# Patient Record
Sex: FEMALE | Race: BLACK OR AFRICAN AMERICAN | NOT HISPANIC OR LATINO | URBAN - METROPOLITAN AREA
[De-identification: names, ages, dates, MRNs, and addresses within clinical notes are randomized per-mention and may not be internally consistent; named-entity substitution may affect disease eponyms.]

---

## 2023-06-25 ENCOUNTER — INPATIENT (INPATIENT)
Facility: HOSPITAL | Age: 73
LOS: 2 days | Discharge: HOME | End: 2023-06-28
Attending: INTERNAL MEDICINE | Admitting: INTERNAL MEDICINE
Payer: MEDICAID

## 2023-06-25 VITALS
DIASTOLIC BLOOD PRESSURE: 112 MMHG | WEIGHT: 130.07 LBS | SYSTOLIC BLOOD PRESSURE: 192 MMHG | TEMPERATURE: 98 F | OXYGEN SATURATION: 95 % | HEART RATE: 96 BPM | RESPIRATION RATE: 18 BRPM

## 2023-06-25 DIAGNOSIS — Z98.49 CATARACT EXTRACTION STATUS, UNSPECIFIED EYE: Chronic | ICD-10-CM

## 2023-06-25 LAB
ALBUMIN SERPL ELPH-MCNC: 3.2 G/DL — LOW (ref 3.3–5)
ALP SERPL-CCNC: 104 U/L — SIGNIFICANT CHANGE UP (ref 40–120)
ALT FLD-CCNC: 15 U/L — SIGNIFICANT CHANGE UP (ref 12–78)
ANION GAP SERPL CALC-SCNC: 6 MMOL/L — SIGNIFICANT CHANGE UP (ref 5–17)
ANISOCYTOSIS BLD QL: SLIGHT — SIGNIFICANT CHANGE UP
APPEARANCE UR: CLEAR — SIGNIFICANT CHANGE UP
APTT BLD: 25.9 SEC — LOW (ref 27.5–35.5)
AST SERPL-CCNC: 21 U/L — SIGNIFICANT CHANGE UP (ref 15–37)
BACTERIA # UR AUTO: ABNORMAL
BASOPHILS # BLD AUTO: 0.05 K/UL — SIGNIFICANT CHANGE UP (ref 0–0.2)
BASOPHILS NFR BLD AUTO: 0.3 % — SIGNIFICANT CHANGE UP (ref 0–2)
BILIRUB SERPL-MCNC: 0.2 MG/DL — SIGNIFICANT CHANGE UP (ref 0.2–1.2)
BILIRUB UR-MCNC: NEGATIVE — SIGNIFICANT CHANGE UP
BUN SERPL-MCNC: 16 MG/DL — SIGNIFICANT CHANGE UP (ref 7–23)
CALCIUM SERPL-MCNC: 9 MG/DL — SIGNIFICANT CHANGE UP (ref 8.5–10.1)
CHLORIDE SERPL-SCNC: 106 MMOL/L — SIGNIFICANT CHANGE UP (ref 96–108)
CO2 SERPL-SCNC: 27 MMOL/L — SIGNIFICANT CHANGE UP (ref 22–31)
COLOR SPEC: YELLOW — SIGNIFICANT CHANGE UP
CREAT SERPL-MCNC: 0.64 MG/DL — SIGNIFICANT CHANGE UP (ref 0.5–1.3)
DIFF PNL FLD: ABNORMAL
EGFR: 94 ML/MIN/1.73M2 — SIGNIFICANT CHANGE UP
EOSINOPHIL # BLD AUTO: 0.05 K/UL — SIGNIFICANT CHANGE UP (ref 0–0.5)
EOSINOPHIL NFR BLD AUTO: 0.3 % — SIGNIFICANT CHANGE UP (ref 0–6)
GLUCOSE SERPL-MCNC: 110 MG/DL — HIGH (ref 70–99)
GLUCOSE UR QL: NEGATIVE MG/DL — SIGNIFICANT CHANGE UP
HCT VFR BLD CALC: 30.2 % — LOW (ref 34.5–45)
HGB BLD-MCNC: 9.6 G/DL — LOW (ref 11.5–15.5)
HYPOCHROMIA BLD QL: SIGNIFICANT CHANGE UP
IMM GRANULOCYTES NFR BLD AUTO: 0.8 % — SIGNIFICANT CHANGE UP (ref 0–0.9)
INR BLD: 0.99 RATIO — SIGNIFICANT CHANGE UP (ref 0.88–1.16)
KETONES UR-MCNC: NEGATIVE — SIGNIFICANT CHANGE UP
LACTATE SERPL-SCNC: 1.1 MMOL/L — SIGNIFICANT CHANGE UP (ref 0.7–2)
LACTATE SERPL-SCNC: 3 MMOL/L — HIGH (ref 0.7–2)
LACTATE SERPL-SCNC: 4.7 MMOL/L — CRITICAL HIGH (ref 0.7–2)
LEUKOCYTE ESTERASE UR-ACNC: ABNORMAL
LIDOCAIN IGE QN: 165 U/L — SIGNIFICANT CHANGE UP (ref 73–393)
LYMPHOCYTES # BLD AUTO: 1.37 K/UL — SIGNIFICANT CHANGE UP (ref 1–3.3)
LYMPHOCYTES # BLD AUTO: 9.4 % — LOW (ref 13–44)
MANUAL SMEAR VERIFICATION: SIGNIFICANT CHANGE UP
MCHC RBC-ENTMCNC: 23.8 PG — LOW (ref 27–34)
MCHC RBC-ENTMCNC: 31.8 G/DL — LOW (ref 32–36)
MCV RBC AUTO: 74.9 FL — LOW (ref 80–100)
MONOCYTES # BLD AUTO: 0.34 K/UL — SIGNIFICANT CHANGE UP (ref 0–0.9)
MONOCYTES NFR BLD AUTO: 2.3 % — SIGNIFICANT CHANGE UP (ref 2–14)
NEUTROPHILS # BLD AUTO: 12.64 K/UL — HIGH (ref 1.8–7.4)
NEUTROPHILS NFR BLD AUTO: 86.9 % — HIGH (ref 43–77)
NITRITE UR-MCNC: POSITIVE
NRBC # BLD: 0 /100 WBCS — SIGNIFICANT CHANGE UP (ref 0–0)
PH UR: 7 — SIGNIFICANT CHANGE UP (ref 5–8)
PLAT MORPH BLD: NORMAL — SIGNIFICANT CHANGE UP
PLATELET # BLD AUTO: 431 K/UL — HIGH (ref 150–400)
POTASSIUM SERPL-MCNC: 3.7 MMOL/L — SIGNIFICANT CHANGE UP (ref 3.5–5.3)
POTASSIUM SERPL-SCNC: 3.7 MMOL/L — SIGNIFICANT CHANGE UP (ref 3.5–5.3)
PROT SERPL-MCNC: 8.9 GM/DL — HIGH (ref 6–8.3)
PROT UR-MCNC: NEGATIVE MG/DL — SIGNIFICANT CHANGE UP
PROTHROM AB SERPL-ACNC: 11.9 SEC — SIGNIFICANT CHANGE UP (ref 10.5–13.4)
RBC # BLD: 4.03 M/UL — SIGNIFICANT CHANGE UP (ref 3.8–5.2)
RBC # FLD: 17.7 % — HIGH (ref 10.3–14.5)
RBC BLD AUTO: ABNORMAL
RBC CASTS # UR COMP ASSIST: ABNORMAL /HPF (ref 0–4)
SODIUM SERPL-SCNC: 139 MMOL/L — SIGNIFICANT CHANGE UP (ref 135–145)
SP GR SPEC: 1 — LOW (ref 1.01–1.02)
TROPONIN I, HIGH SENSITIVITY RESULT: 6.2 NG/L — SIGNIFICANT CHANGE UP
UROBILINOGEN FLD QL: NEGATIVE MG/DL — SIGNIFICANT CHANGE UP
WBC # BLD: 14.57 K/UL — HIGH (ref 3.8–10.5)
WBC # FLD AUTO: 14.57 K/UL — HIGH (ref 3.8–10.5)
WBC UR QL: ABNORMAL

## 2023-06-25 PROCEDURE — 99285 EMERGENCY DEPT VISIT HI MDM: CPT

## 2023-06-25 PROCEDURE — 71045 X-RAY EXAM CHEST 1 VIEW: CPT | Mod: 26

## 2023-06-25 PROCEDURE — 99223 1ST HOSP IP/OBS HIGH 75: CPT

## 2023-06-25 PROCEDURE — 93010 ELECTROCARDIOGRAM REPORT: CPT

## 2023-06-25 PROCEDURE — 74177 CT ABD & PELVIS W/CONTRAST: CPT | Mod: 26,MA

## 2023-06-25 RX ORDER — SODIUM CHLORIDE 9 MG/ML
1000 INJECTION INTRAMUSCULAR; INTRAVENOUS; SUBCUTANEOUS
Refills: 0 | Status: DISCONTINUED | OUTPATIENT
Start: 2023-06-25 | End: 2023-06-26

## 2023-06-25 RX ORDER — AMLODIPINE BESYLATE 2.5 MG/1
10 TABLET ORAL ONCE
Refills: 0 | Status: COMPLETED | OUTPATIENT
Start: 2023-06-25 | End: 2023-06-25

## 2023-06-25 RX ORDER — SODIUM CHLORIDE 9 MG/ML
1000 INJECTION INTRAMUSCULAR; INTRAVENOUS; SUBCUTANEOUS ONCE
Refills: 0 | Status: COMPLETED | OUTPATIENT
Start: 2023-06-25 | End: 2023-06-25

## 2023-06-25 RX ORDER — ONDANSETRON 8 MG/1
4 TABLET, FILM COATED ORAL EVERY 8 HOURS
Refills: 0 | Status: DISCONTINUED | OUTPATIENT
Start: 2023-06-25 | End: 2023-06-28

## 2023-06-25 RX ORDER — ACETAMINOPHEN 500 MG
650 TABLET ORAL EVERY 6 HOURS
Refills: 0 | Status: DISCONTINUED | OUTPATIENT
Start: 2023-06-25 | End: 2023-06-28

## 2023-06-25 RX ORDER — CEFUROXIME AXETIL 250 MG
1 TABLET ORAL
Qty: 14 | Refills: 0
Start: 2023-06-25 | End: 2023-07-01

## 2023-06-25 RX ORDER — ACETAMINOPHEN 500 MG
2 TABLET ORAL
Qty: 30 | Refills: 0
Start: 2023-06-25 | End: 2023-06-29

## 2023-06-25 RX ORDER — FAMOTIDINE 10 MG/ML
20 INJECTION INTRAVENOUS ONCE
Refills: 0 | Status: COMPLETED | OUTPATIENT
Start: 2023-06-25 | End: 2023-06-25

## 2023-06-25 RX ORDER — ENOXAPARIN SODIUM 100 MG/ML
40 INJECTION SUBCUTANEOUS EVERY 24 HOURS
Refills: 0 | Status: DISCONTINUED | OUTPATIENT
Start: 2023-06-25 | End: 2023-06-28

## 2023-06-25 RX ORDER — AMLODIPINE BESYLATE 2.5 MG/1
1 TABLET ORAL
Qty: 30 | Refills: 0
Start: 2023-06-25 | End: 2023-07-24

## 2023-06-25 RX ORDER — LANOLIN ALCOHOL/MO/W.PET/CERES
3 CREAM (GRAM) TOPICAL AT BEDTIME
Refills: 0 | Status: DISCONTINUED | OUTPATIENT
Start: 2023-06-25 | End: 2023-06-28

## 2023-06-25 RX ORDER — KETOROLAC TROMETHAMINE 30 MG/ML
30 SYRINGE (ML) INJECTION ONCE
Refills: 0 | Status: DISCONTINUED | OUTPATIENT
Start: 2023-06-25 | End: 2023-06-25

## 2023-06-25 RX ORDER — ACETAMINOPHEN 500 MG
975 TABLET ORAL ONCE
Refills: 0 | Status: COMPLETED | OUTPATIENT
Start: 2023-06-25 | End: 2023-06-25

## 2023-06-25 RX ORDER — CEFTRIAXONE 500 MG/1
1000 INJECTION, POWDER, FOR SOLUTION INTRAMUSCULAR; INTRAVENOUS ONCE
Refills: 0 | Status: COMPLETED | OUTPATIENT
Start: 2023-06-25 | End: 2023-06-25

## 2023-06-25 RX ORDER — ONDANSETRON 8 MG/1
4 TABLET, FILM COATED ORAL ONCE
Refills: 0 | Status: COMPLETED | OUTPATIENT
Start: 2023-06-25 | End: 2023-06-25

## 2023-06-25 RX ADMIN — Medication 30 MILLIGRAM(S): at 21:00

## 2023-06-25 RX ADMIN — ONDANSETRON 4 MILLIGRAM(S): 8 TABLET, FILM COATED ORAL at 18:39

## 2023-06-25 RX ADMIN — AMLODIPINE BESYLATE 10 MILLIGRAM(S): 2.5 TABLET ORAL at 21:33

## 2023-06-25 RX ADMIN — SODIUM CHLORIDE 75 MILLILITER(S): 9 INJECTION INTRAMUSCULAR; INTRAVENOUS; SUBCUTANEOUS at 23:44

## 2023-06-25 RX ADMIN — CEFTRIAXONE 100 MILLIGRAM(S): 500 INJECTION, POWDER, FOR SOLUTION INTRAMUSCULAR; INTRAVENOUS at 20:36

## 2023-06-25 RX ADMIN — CEFTRIAXONE 1000 MILLIGRAM(S): 500 INJECTION, POWDER, FOR SOLUTION INTRAMUSCULAR; INTRAVENOUS at 21:34

## 2023-06-25 RX ADMIN — SODIUM CHLORIDE 1000 MILLILITER(S): 9 INJECTION INTRAMUSCULAR; INTRAVENOUS; SUBCUTANEOUS at 18:39

## 2023-06-25 RX ADMIN — FAMOTIDINE 20 MILLIGRAM(S): 10 INJECTION INTRAVENOUS at 18:40

## 2023-06-25 RX ADMIN — SODIUM CHLORIDE 1000 MILLILITER(S): 9 INJECTION INTRAMUSCULAR; INTRAVENOUS; SUBCUTANEOUS at 20:21

## 2023-06-25 RX ADMIN — Medication 975 MILLIGRAM(S): at 21:34

## 2023-06-25 RX ADMIN — Medication 975 MILLIGRAM(S): at 21:00

## 2023-06-25 RX ADMIN — Medication 30 MILLIGRAM(S): at 21:34

## 2023-06-25 NOTE — ED PROVIDER NOTE - NSFOLLOWUPCLINICS_GEN_ALL_ED_FT
[facility] [place]  Internal Medicine  [address] [place] [address]  Phone: [phone]  Fax: [phone]  Follow Up Time: 4-6 Days

## 2023-06-25 NOTE — H&P ADULT - NSVTERISKREFERASSESS_GEN_ALL_CORE
Refer to the Assessment tab to view/cancel completed assessment. The patient is a 30y Female complaining of

## 2023-06-25 NOTE — H&P ADULT - NSHPLABSRESULTS_GEN_ALL_CORE
labs personally reviewed and pertinent [facility] documents/labs/diagnostics reviewed                         9.6    14.57 )-----------( 431      ( 2023 18:06 )             30.2       -    139  |  106  |  16  ----------------------------<  110<H>  3.7   |  27  |  0.64    Ca    9.0      2023 18:06    TPro  8.9<H>  /  Alb  3.2<L>  /  TBili  0.2  /  DBili  x   /  AST  21  /  ALT  15  /  AlkPhos  104  -    PT/INR - ( 2023 18:06 )   PT: 11.9 sec;   INR: 0.99 ratio        PTT - ( 2023 18:06 )  PTT:25.9 sec    Urinalysis Basic - ( 2023 19:00 )    Color: Yellow / Appearance: Clear / S.005 / pH: x  Gluc: x / Ketone: Negative  / Bili: Negative / Urobili: Negative mg/dL   Blood: x / Protein: Negative mg/dL / Nitrite: Positive   Leuk Esterase: Small / RBC: 3-5 /HPF / WBC 11-25   Sq Epi: x / Non Sq Epi: x / Bacteria: Moderate    EKG: read by me: NSR at 98bpm, no ST changes or TWI; QTc 0.469      RADIOLOGY: labs personally reviewed and pertinent [facility] documents/labs/diagnostics reviewed                         9.6    14.57 )-----------( 431      ( 2023 18:06 )             30.2           139  |  106  |  16  ----------------------------<  110<H>  3.7   |  27  |  0.64    Ca    9.0      2023 18:06    TPro  8.9<H>  /  Alb  3.2<L>  /  TBili  0.2  /  DBili  x   /  AST  21  /  ALT  15  /  AlkPhos  104  -    PT/INR - ( 2023 18:06 )   PT: 11.9 sec;   INR: 0.99 ratio        PTT - ( 2023 18:06 )  PTT:25.9 sec    Urinalysis Basic - ( 2023 19:00 )    Color: Yellow / Appearance: Clear / S.005 / pH: x  Gluc: x / Ketone: Negative  / Bili: Negative / Urobili: Negative mg/dL   Blood: x / Protein: Negative mg/dL / Nitrite: Positive   Leuk Esterase: Small / RBC: 3-5 /HPF / WBC 11-25   Sq Epi: x / Non Sq Epi: x / Bacteria: Moderate    EKG: read by me: NSR at 98bpm, no ST changes or TWI; QTc 0.469      RADIOLOGY:  23 CT abd/pelvis w/ contrast-  IMPRESSION:  No evidence of acute intra-abdominal pathology.

## 2023-06-25 NOTE — ED ADULT TRIAGE NOTE - CHIEF COMPLAINT QUOTE
patient BIBA c/o of abdominal pain with N/V started today c/o of constipation for 2 days , denied chest pain, denied  dizziness no headache

## 2023-06-25 NOTE — H&P ADULT - ASSESSMENT
ASSESSMENT:  This is a 73 year old female with PMHx of polymyalgia rheumatica not on meds presents for evaluation after a 1 day history of sudden onset abdominal cramping, initially starting yemi-umbilically, and progressively worsening into diffuse intermittent non-radiating abdominal pain.     PLAN:  1.Abdominal pain likely secondary to UTI vs gastroenteritis  -afebrile, no leukocytosis  -will keep NPO  -will start IVF  -will start protonix 40mg IV daily   -will start prn maalox  -lipase  -LFTs  -initial troponin *** at ***, will f/u 2nd troponin  -EKG as above  -will f/u U/A  -will f/u CT abd/pelvis w/ contrast  -will start prn zofran  -will send stool cultures, O&P, and WBC  -will scan if symptoms worsens  -S/P in ED    2.    DVT PPx ASSESSMENT:  This is a 73 year old female with PMHx of polymyalgia rheumatica not on meds presents for evaluation after a 1 day history of sudden onset abdominal cramping, initially starting yemi-umbilically, and progressively worsening into diffuse intermittent non-radiating abdominal pain.     PLAN:  1.Abdominal pain likely secondary to UTI vs gastroenteritis  -afebrile  -leukocytosis 14.57  -will keep NPO  -S/P 1L NS in ED, will switch to LR  -will start protonix 40mg IV daily   -will start prn maalox  -lipase 165  -LFTs within normal limits  -EKG as above  -U/A as above  -will f/u urine cultures  -will f/u CT abd/pelvis w/ contrast  -will start prn zofran  -will send stool cultures, O&P, and WBC  -will scan if symptoms worsens  -S/P in ED  -PTT 25.9  -INR 0.99  -PT 11.9  -initial troponin 6.2 at 18:06  -lactic acid 1.1 --->3.1--->4.7, will f/u repeat        2.Anemia  -hemoglobin 9.6  -hematocrit 30.2  -will monior for now  -will f/u iron, ferritin, TIBC, B12, folate and retic count    3.Thrombocytosis    DVT PPx ASSESSMENT:  This is a 73 year old female with PMHx of polymyalgia rheumatica not on meds presents for evaluation after a 1 day history of sudden onset abdominal cramping, initially starting yemi-umbilically, and progressively worsening into diffuse intermittent non-radiating abdominal pain.     PLAN:  1.Abdominal pain likely secondary to UTI vs gastroenteritis  -afebrile  -leukocytosis 14.57  -will keep NPO  -S/P 1L NS in ED, will switch to LR  -lipase 165  -LFTs within normal limits  -EKG as above  -U/A as above  -will f/u urine cultures  -CT abd/pelvis w/ contrast as above  -will f/u official report of CXRAY  -will start prn zofran  -will send stool cultures, O&P, and WBC if has >3 episodes of diarrhea in 24 hours  -S/P rocephin 1g IV X 1 in ED, will continue  -S/P tylenol 975mg po X 1, pepcid 20mg IV X1, toradol 30mg IV X 1, zofran 4mg IV X 1 and norvasc 10mg po X 1 in ED  -PTT 25.9  -INR 0.99  -PT 11.9  -initial troponin 6.2 at 18:06  -lactic acid 1.1 --->3.1--->4.7, will f/u repeat  -will f/u stool cultures  -will consult PT in AM    2.Anemia  -hemoglobin 9.6  -hematocrit 30.2  -will monitor for now  -will f/u iron, ferritin, TIBC, B12, folate and retic count    3.Thrombocytosis    4.PMR    5.DVT PPx  -will start lovenox 40mg SQ QD and SCDs

## 2023-06-25 NOTE — H&P ADULT - HISTORY OF PRESENT ILLNESS
This is a 73 year old female with PMHx of polymyalgia rheumatica not on meds presents for evaluation after a 1 day history of sudden onset abdominal cramping, initially starting yemi-umbilically, and progressively worsening into diffuse intermittent non-radiating abdominal pain.  as per son was at baseline, then suddenly while sitting developed pain.  Patient does not ambulate, so was picked up and placed on toilet for a possible bowel movement, but pain worsened.  She did have 1 episode of non-bloody non-bilious emesis.  +3 episodes watery, nonbloody diarrhea, last about 1 hour ago.  Of note she recently travelled from [facility] 3 days prior without issue.  Others have eaten same food as her in the house without issue.

## 2023-06-25 NOTE — H&P ADULT - NSHPPHYSICALEXAM_GEN_ALL_CORE
PHYSICAL EXAM:  Vital Signs:  Vital Signs (24 Hrs):  T(C): 37.1 (06-25-23 @ 22:56), Max: 37.3 (06-25-23 @ 21:07)  HR: 98 (06-25-23 @ 22:40) (96 - 98)  BP: 163/76 (06-25-23 @ 22:40) (163/76 - 192/112)  RR: 16 (06-25-23 @ 22:40) (16 - 18)  SpO2: 95% (06-25-23 @ 22:40) (95% - 96%)  Wt(kg): --  Daily     Daily     I&O's Summary    General: no acute distress and well developed/well nourished, thin  HENT: atraumatic, normocephalic, oropharynx pink and moist, sinuses nontender, normal nasal mucosa/turbinates, thyroid not enlarged or tender, no radiating carotid murmur or bruit and no masses; no JVD  Eyes: pupil equally round and reactive to light (PERRLA) bilaterally, extra-ocular muscle intact (EOMI) bilaterally, lids/conjunctiva normal bilaterally and anicteric bilaterally  Neck: normal, supple, no adenopathy and thyroid normal in size, no nodules or tenderness  CV: normal s1, s2 , regular rhythm, no murmurs, no rubs and no gallops  Lungs: clear to auscultation no w/r/r  Abd: normal bowel sounds, no hepatosplenomegaly, non-tender, non-distended and no masses  Musculoskeletal: no clubbing, cyanosis and full range of motion of joints: right upper extremity, left upper extremity, right lower extremity and left lower extremity;  2+ Peripheral Pulses, No clubbing, cyanosis, or edema  Neuro: alert, awake & oriented times three (AA&O x 3), cranial Nerves II-XII intact and normal strength  Psych: normal judgment and insight, normal mood/affect and non-anxious  Vascular: 2+ radial and dorsalis pedis pulses B/L equal and symmetric  Skin: no rash, warm and dry

## 2023-06-25 NOTE — ED PROVIDER NOTE - OBJECTIVE STATEMENT
74 y/o female with polyarthritis rheumatica brought in by son for abdominal pain and vomiting x 1 day. Son reports pt was c/o pain and vomiting 1 episode of solid food. Denies diarrhea. Last bm was 2 days ago. Pt recently came from [facility] about 3 days ago. Currently denies any abdomina pain, fever, chills, urinary symptoms, chest pain, dyspnea. Denies sick contact.

## 2023-06-25 NOTE — H&P ADULT - NSHPREVIEWOFSYSTEMS_GEN_ALL_CORE
He denies her having any fever, chills, cough, sore throat, rhinorrhea, chest pain, SOB, dysuria, weakness, lethargy, sick contact.

## 2023-06-25 NOTE — ED PROVIDER NOTE - NSFOLLOWUPINSTRUCTIONS_ED_ALL_ED_FT
Take the antibiotics completely as prescribed: CEFUROXIME.    Take the pain medicine ACETAMINOPHEN as needed. Take the antibiotics completely as prescribed: CEFUROXIME.    Take the pain medicine ACETAMINOPHEN as needed.    Call the primary care clinic to follow up with your blood pressure.

## 2023-06-25 NOTE — ED PROVIDER NOTE - NS ED ATTENDING STATEMENT MOD
This was a shared visit with the ROBEL. I reviewed and verified the documentation and independently performed the documented:

## 2023-06-25 NOTE — ED PROVIDER NOTE - PATIENT PORTAL LINK FT
You can access the FollowMyHealth Patient Portal offered by [facility] by registering at the following website: [email or web address]. By joining [employer]’s FollowMyHealth portal, you will also be able to view your health information using other applications (apps) compatible with our system.

## 2023-06-25 NOTE — ED ADULT NURSE NOTE - NSFALLHARMRISKINTERV_ED_ALL_ED
Assistance OOB with selected safe patient handling equipment if applicable/Communicate risk of Fall with Harm to all staff, patient, and family/Provide visual cue: red socks, yellow wristband, yellow gown, etc/Reinforce activity limits and safety measures with patient and family/Bed in lowest position, wheels locked, appropriate side rails in place/Call bell, personal items and telephone in reach/Instruct patient to call for assistance before getting out of bed/chair/stretcher/Non-slip footwear applied when patient is off stretcher/[place] to call system/Physically safe environment - no spills, clutter or unnecessary equipment/Purposeful Proactive Rounding/Room/bathroom lighting operational, light cord in reach

## 2023-06-25 NOTE — ED PROVIDER NOTE - NS ED ROS FT
CONSTITUTIONAL: No fever, no chills, no fatigue  EYES: No visual changes  ENT: No ear pain, no sore throat  CARDIOVASCULAR: No chest pain, no palpitations  RESPIRATORY: No cough, no SOB  GI: no constipation, no diarrhea  GENITOURINARY: No dysuria, no frequency, no hematuria  MUSKULOSKELETAL: No back pain, no joint pain, no myalgias  SKIN: No rash  NEURO: No headache    ALL OTHER SYSTEMS NEGATIVE.

## 2023-06-25 NOTE — ED ADULT TRIAGE NOTE - BIRTH SEX
Female Complex Repair And Xenograft Text: The defect edges were debeveled with a #15 scalpel blade.  The primary defect was closed partially with a complex linear closure.  Given the location of the defect, shape of the defect and the proximity to free margins a xenograft was deemed most appropriate to repair the remaining defect.  The graft was trimmed to fit the size of the remaining defect.  The graft was then placed in the primary defect, oriented appropriately, and sutured into place.

## 2023-06-25 NOTE — ED PROVIDER NOTE - CLINICAL SUMMARY MEDICAL DECISION MAKING FREE TEXT BOX
74 y/o female with polyarthritis rheumatica here with vomiting and abdominal pain x 1 day. Currently denies any pain. Recently from [facility].   will check labs including troponin, UA, ivf, pepcid, zofran, ekg and obtain ct a/p. 72 y/o female with polyarthritis rheumatica here with vomiting and abdominal pain x 1 day. Currently denies any pain. Recently from [place].   will check labs including troponin, UA, ivf, pepcid, zofran, ekg and obtain ct a/p.    wbc 14 and ua suggests uti. will start abx. otherwise, non toxic and tolerating po. can dc home 74 y/o female with polyarthritis rheumatica here with vomiting and abdominal pain x 1 day. Currently denies any pain. Recently from [facility].   will check labs including troponin, UA, ivf, pepcid, zofran, ekg and obtain ct a/p.    wbc 14 and ua suggests uti. will start abx. otherwise, non toxic and tolerating po.     lactic acids are rising, will admit for serial belly exams. at 10:35pm, she has no pain is very comfortable and has tolerated po.

## 2023-06-26 DIAGNOSIS — K52.9 NONINFECTIVE GASTROENTERITIS AND COLITIS, UNSPECIFIED: ICD-10-CM

## 2023-06-26 DIAGNOSIS — D63.8 ANEMIA IN OTHER CHRONIC DISEASES CLASSIFIED ELSEWHERE: ICD-10-CM

## 2023-06-26 DIAGNOSIS — M06.9 RHEUMATOID ARTHRITIS, UNSPECIFIED: ICD-10-CM

## 2023-06-26 DIAGNOSIS — N39.0 URINARY TRACT INFECTION, SITE NOT SPECIFIED: ICD-10-CM

## 2023-06-26 LAB
ANION GAP SERPL CALC-SCNC: 9 MMOL/L — SIGNIFICANT CHANGE UP (ref 5–17)
BUN SERPL-MCNC: 9 MG/DL — SIGNIFICANT CHANGE UP (ref 7–23)
CALCIUM SERPL-MCNC: 8.5 MG/DL — SIGNIFICANT CHANGE UP (ref 8.5–10.1)
CHLORIDE SERPL-SCNC: 108 MMOL/L — SIGNIFICANT CHANGE UP (ref 96–108)
CO2 SERPL-SCNC: 27 MMOL/L — SIGNIFICANT CHANGE UP (ref 22–31)
CREAT SERPL-MCNC: 0.59 MG/DL — SIGNIFICANT CHANGE UP (ref 0.5–1.3)
EGFR: 96 ML/MIN/1.73M2 — SIGNIFICANT CHANGE UP
FERRITIN SERPL-MCNC: 24 NG/ML — SIGNIFICANT CHANGE UP (ref 15–150)
FOLATE SERPL-MCNC: >20 NG/ML — SIGNIFICANT CHANGE UP
GLUCOSE SERPL-MCNC: 98 MG/DL — SIGNIFICANT CHANGE UP (ref 70–99)
HCT VFR BLD CALC: 26 % — LOW (ref 34.5–45)
HCV AB S/CO SERPL IA: 0.96 S/CO — SIGNIFICANT CHANGE UP (ref 0–0.99)
HCV AB SERPL-IMP: SIGNIFICANT CHANGE UP
HGB BLD-MCNC: 8.3 G/DL — LOW (ref 11.5–15.5)
IRON SATN MFR SERPL: 11 % — LOW (ref 14–50)
IRON SATN MFR SERPL: 30 UG/DL — SIGNIFICANT CHANGE UP (ref 30–160)
LACTATE SERPL-SCNC: 1.2 MMOL/L — SIGNIFICANT CHANGE UP (ref 0.7–2)
MCHC RBC-ENTMCNC: 23.9 PG — LOW (ref 27–34)
MCHC RBC-ENTMCNC: 31.9 G/DL — LOW (ref 32–36)
MCV RBC AUTO: 74.7 FL — LOW (ref 80–100)
NRBC # BLD: 0 /100 WBCS — SIGNIFICANT CHANGE UP (ref 0–0)
PLATELET # BLD AUTO: 392 K/UL — SIGNIFICANT CHANGE UP (ref 150–400)
POTASSIUM SERPL-MCNC: 3.4 MMOL/L — LOW (ref 3.5–5.3)
POTASSIUM SERPL-SCNC: 3.4 MMOL/L — LOW (ref 3.5–5.3)
RBC # BLD: 3.48 M/UL — LOW (ref 3.8–5.2)
RBC # BLD: 3.48 M/UL — LOW (ref 3.8–5.2)
RBC # FLD: 17.7 % — HIGH (ref 10.3–14.5)
RETICS #: 38.6 K/UL — SIGNIFICANT CHANGE UP (ref 25–125)
RETICS/RBC NFR: 1.1 % — SIGNIFICANT CHANGE UP (ref 0.5–2.5)
SODIUM SERPL-SCNC: 144 MMOL/L — SIGNIFICANT CHANGE UP (ref 135–145)
TIBC SERPL-MCNC: 269 UG/DL — SIGNIFICANT CHANGE UP (ref 220–430)
TSH SERPL-MCNC: 1.17 UIU/ML — SIGNIFICANT CHANGE UP (ref 0.36–3.74)
UIBC SERPL-MCNC: 238 UG/DL — SIGNIFICANT CHANGE UP (ref 110–370)
VIT B12 SERPL-MCNC: >2000 PG/ML — HIGH (ref 232–1245)
WBC # BLD: 16.42 K/UL — HIGH (ref 3.8–10.5)
WBC # FLD AUTO: 16.42 K/UL — HIGH (ref 3.8–10.5)

## 2023-06-26 PROCEDURE — 99232 SBSQ HOSP IP/OBS MODERATE 35: CPT

## 2023-06-26 RX ORDER — SODIUM CHLORIDE 9 MG/ML
1000 INJECTION, SOLUTION INTRAVENOUS ONCE
Refills: 0 | Status: COMPLETED | OUTPATIENT
Start: 2023-06-26 | End: 2023-06-26

## 2023-06-26 RX ORDER — SODIUM CHLORIDE 9 MG/ML
1000 INJECTION, SOLUTION INTRAVENOUS
Refills: 0 | Status: DISCONTINUED | OUTPATIENT
Start: 2023-06-26 | End: 2023-06-26

## 2023-06-26 RX ORDER — METOPROLOL TARTRATE 50 MG
12.5 TABLET ORAL ONCE
Refills: 0 | Status: COMPLETED | OUTPATIENT
Start: 2023-06-26 | End: 2023-06-26

## 2023-06-26 RX ORDER — CEFTRIAXONE 500 MG/1
1000 INJECTION, POWDER, FOR SOLUTION INTRAMUSCULAR; INTRAVENOUS EVERY 24 HOURS
Refills: 0 | Status: DISCONTINUED | OUTPATIENT
Start: 2023-06-26 | End: 2023-06-28

## 2023-06-26 RX ADMIN — Medication 650 MILLIGRAM(S): at 11:58

## 2023-06-26 RX ADMIN — ENOXAPARIN SODIUM 40 MILLIGRAM(S): 100 INJECTION SUBCUTANEOUS at 17:16

## 2023-06-26 RX ADMIN — SODIUM CHLORIDE 1000 MILLILITER(S): 9 INJECTION, SOLUTION INTRAVENOUS at 00:40

## 2023-06-26 RX ADMIN — Medication 3 MILLIGRAM(S): at 22:16

## 2023-06-26 RX ADMIN — CEFTRIAXONE 100 MILLIGRAM(S): 500 INJECTION, POWDER, FOR SOLUTION INTRAMUSCULAR; INTRAVENOUS at 20:38

## 2023-06-26 RX ADMIN — Medication 12.5 MILLIGRAM(S): at 00:40

## 2023-06-26 RX ADMIN — Medication 650 MILLIGRAM(S): at 22:16

## 2023-06-26 RX ADMIN — Medication 650 MILLIGRAM(S): at 10:59

## 2023-06-26 RX ADMIN — Medication 650 MILLIGRAM(S): at 23:15

## 2023-06-26 NOTE — PHYSICAL THERAPY INITIAL EVALUATION ADULT - FOLLOWS COMMANDS/ANSWERS QUESTIONS, REHAB EVAL
Unable to count from 100 or 10 backwatds. Unable to do simple addition like 18 +12 or 17+ 15/100% of the time/able to follow single-step instructions

## 2023-06-26 NOTE — PHYSICAL THERAPY INITIAL EVALUATION ADULT - PERTINENT HX OF CURRENT PROBLEM, REHAB EVAL
Pt admitted for abdominal cramping.   No c/o abdominal cramps at this session .  Per patient she has been unable to ambulate since 2014. She was taken care of by her children  in [facility] until 6/21/23  On 6/22/23 pt moved to [place] from [facility] to live with her son Nico . This is her first hospital [place] in [place],

## 2023-06-26 NOTE — PATIENT PROFILE ADULT - FUNCTIONAL ASSESSMENT - DAILY ACTIVITY 1.

## 2023-06-26 NOTE — PHYSICAL THERAPY INITIAL EVALUATION ADULT - GENERAL OBSERVATIONS, REHAB EVAL
Seen with creole translation help from [employer]  ID [id number]. Pt recd supine NAD c BLE in flex 2* pain in B knees/ankles. No c/o pain in B hips and BUE. Noted deformities in B hands . Pt has h/o  Polymyalgia rheumatica.

## 2023-06-26 NOTE — PHYSICAL THERAPY INITIAL EVALUATION ADULT - MODALITIES TREATMENT COMMENTS
Case d/w son over the phone- Pt is functioning at pre morbid level. No restorative needs at this time. Son expressed need for HHA services.

## 2023-06-26 NOTE — PHYSICAL THERAPY INITIAL EVALUATION ADULT - PATIENT/FAMILY AGREES WITH PLAN
Case d/w pt via  and on the phone with son Nico- Pt  is functioning at baseline- no skilled PT needs/yes

## 2023-06-26 NOTE — PHYSICAL THERAPY INITIAL EVALUATION ADULT - ADDITIONAL COMMENTS
Live sin a PH with 3 step to enter c HRs. Pt is carried on the steps in wheelchair . Pt has commode .   Pt uses diapers/[pull ups at night  time.

## 2023-06-26 NOTE — PHYSICAL THERAPY INITIAL EVALUATION ADULT - ORIENTATION, REHAB EVAL
Partial to place and time. Knows she is in hospital but not the name of hospital; Knows month/year but not date/day./person/situation

## 2023-06-26 NOTE — PATIENT PROFILE ADULT - FALL HARM RISK - RISK INTERVENTIONS

## 2023-06-26 NOTE — PHYSICAL THERAPY INITIAL EVALUATION ADULT - TRANSFER SKILLS, REHAB EVAL
Walking Pt is lifted from bed to chair to commode by family  as pt cannot std/walk/unable to perform

## 2023-06-26 NOTE — PROGRESS NOTE ADULT - SUBJECTIVE AND OBJECTIVE BOX
PROGRESS NOTE:   Authored by Dr. Jaquelin Cha MD, Available on MS Teams    Patient is a 73y old  Female who presents with a chief complaint of vomiting (25 Jun 2023 23:23)      SUBJECTIVE / OVERNIGHT EVENTS: French creole  [id number]. Patient feels well, no chest pain or shortness of breath. No dysuria but does have foul smelling urine.    ADDITIONAL REVIEW OF SYSTEMS:    MEDICATIONS  (STANDING):  cefTRIAXone   IVPB 1000 milliGRAM(s) IV Intermittent every 24 hours  enoxaparin Injectable 40 milliGRAM(s) SubCutaneous every 24 hours  lactated ringers. 1000 milliLiter(s) (75 mL/Hr) IV Continuous <Continuous>    MEDICATIONS  (PRN):  acetaminophen     Tablet .. 650 milliGRAM(s) Oral every 6 hours PRN Temp greater or equal to 38C (100.4F), Mild Pain (1 - 3)  aluminum hydroxide/magnesium hydroxide/simethicone Suspension 30 milliLiter(s) Oral every 4 hours PRN Dyspepsia  melatonin 3 milliGRAM(s) Oral at bedtime PRN Insomnia  ondansetron Injectable 4 milliGRAM(s) IV Push every 8 hours PRN Nausea and/or Vomiting      CAPILLARY BLOOD GLUCOSE        I&O's Summary    25 Jun 2023 07:01  -  26 Jun 2023 07:00  --------------------------------------------------------  IN: 0 mL / OUT: 1000 mL / NET: -1000 mL        PHYSICAL EXAM:  Vital Signs Last 24 Hrs  T(C): 36.9 (26 Jun 2023 11:20), Max: 37.3 (25 Jun 2023 21:07)  T(F): 98.4 (26 Jun 2023 11:20), Max: 99.1 (25 Jun 2023 21:07)  HR: 77 (26 Jun 2023 11:20) (77 - 115)  BP: 153/77 (26 Jun 2023 11:20) (145/78 - 192/112)  BP(mean): --  RR: 19 (26 Jun 2023 11:20) (16 - 19)  SpO2: 97% (26 Jun 2023 11:20) (95% - 97%)    Parameters below as of 26 Jun 2023 11:20  Patient On (Oxygen Delivery Method): room air        CONSTITUTIONAL: NAD, well-developed  RESPIRATORY: Normal respiratory effort; lungs are clear to auscultation bilaterally  CARDIOVASCULAR: Regular rate and rhythm, normal S1 and S2, no murmur/rub/gallop; No lower extremity edema  ABDOMEN: Nontender to palpation, normoactive bowel sounds, no rebound/guarding  MUSCLOSKELETAL: no clubbing or cyanosis of digits; no joint swelling or tenderness to palpation  PSYCH: A+O to person, place, and time; affect appropriate    LABS:                        8.3    16.42 )-----------( 392      ( 26 Jun 2023 02:30 )             26.0     06-26    144  |  108  |  9   ----------------------------<  98  3.4<L>   |  27  |  0.59    Ca    8.5      26 Jun 2023 02:30    TPro  8.9<H>  /  Alb  3.2<L>  /  TBili  0.2  /  DBili  x   /  AST  21  /  ALT  15  /  AlkPhos  104  06-25    PT/INR - ( 25 Jun 2023 18:06 )   PT: 11.9 sec;   INR: 0.99 ratio         PTT - ( 25 Jun 2023 18:06 )  PTT:25.9 sec      Urinalysis Basic - ( 26 Jun 2023 02:30 )    Color: x / Appearance: x / SG: x / pH: x  Gluc: 98 mg/dL / Ketone: x  / Bili: x / Urobili: x   Blood: x / Protein: x / Nitrite: x   Leuk Esterase: x / RBC: x / WBC x   Sq Epi: x / Non Sq Epi: x / Bacteria: x

## 2023-06-27 LAB
ANION GAP SERPL CALC-SCNC: 7 MMOL/L — SIGNIFICANT CHANGE UP (ref 5–17)
BASOPHILS # BLD AUTO: 0.03 K/UL — SIGNIFICANT CHANGE UP (ref 0–0.2)
BASOPHILS NFR BLD AUTO: 0.2 % — SIGNIFICANT CHANGE UP (ref 0–2)
BUN SERPL-MCNC: 8 MG/DL — SIGNIFICANT CHANGE UP (ref 7–23)
CALCIUM SERPL-MCNC: 8.5 MG/DL — SIGNIFICANT CHANGE UP (ref 8.5–10.1)
CHLORIDE SERPL-SCNC: 104 MMOL/L — SIGNIFICANT CHANGE UP (ref 96–108)
CO2 SERPL-SCNC: 28 MMOL/L — SIGNIFICANT CHANGE UP (ref 22–31)
CREAT SERPL-MCNC: 0.57 MG/DL — SIGNIFICANT CHANGE UP (ref 0.5–1.3)
EGFR: 97 ML/MIN/1.73M2 — SIGNIFICANT CHANGE UP
EOSINOPHIL # BLD AUTO: 0.05 K/UL — SIGNIFICANT CHANGE UP (ref 0–0.5)
EOSINOPHIL NFR BLD AUTO: 0.4 % — SIGNIFICANT CHANGE UP (ref 0–6)
GLUCOSE SERPL-MCNC: 83 MG/DL — SIGNIFICANT CHANGE UP (ref 70–99)
HCT VFR BLD CALC: 27.8 % — LOW (ref 34.5–45)
HGB BLD-MCNC: 8.8 G/DL — LOW (ref 11.5–15.5)
IMM GRANULOCYTES NFR BLD AUTO: 0.5 % — SIGNIFICANT CHANGE UP (ref 0–0.9)
LYMPHOCYTES # BLD AUTO: 16.8 % — SIGNIFICANT CHANGE UP (ref 13–44)
LYMPHOCYTES # BLD AUTO: 2.2 K/UL — SIGNIFICANT CHANGE UP (ref 1–3.3)
MAGNESIUM SERPL-MCNC: 1.9 MG/DL — SIGNIFICANT CHANGE UP (ref 1.6–2.6)
MCHC RBC-ENTMCNC: 23.8 PG — LOW (ref 27–34)
MCHC RBC-ENTMCNC: 31.7 G/DL — LOW (ref 32–36)
MCV RBC AUTO: 75.3 FL — LOW (ref 80–100)
MONOCYTES # BLD AUTO: 0.42 K/UL — SIGNIFICANT CHANGE UP (ref 0–0.9)
MONOCYTES NFR BLD AUTO: 3.2 % — SIGNIFICANT CHANGE UP (ref 2–14)
NEUTROPHILS # BLD AUTO: 10.34 K/UL — HIGH (ref 1.8–7.4)
NEUTROPHILS NFR BLD AUTO: 78.9 % — HIGH (ref 43–77)
NRBC # BLD: 0 /100 WBCS — SIGNIFICANT CHANGE UP (ref 0–0)
PHOSPHATE SERPL-MCNC: 4.1 MG/DL — SIGNIFICANT CHANGE UP (ref 2.5–4.5)
PLATELET # BLD AUTO: 387 K/UL — SIGNIFICANT CHANGE UP (ref 150–400)
POTASSIUM SERPL-MCNC: 3 MMOL/L — LOW (ref 3.5–5.3)
POTASSIUM SERPL-SCNC: 3 MMOL/L — LOW (ref 3.5–5.3)
PROCALCITONIN SERPL-MCNC: 0.37 NG/ML — HIGH (ref 0.02–0.1)
RBC # BLD: 3.69 M/UL — LOW (ref 3.8–5.2)
RBC # FLD: 17.5 % — HIGH (ref 10.3–14.5)
SODIUM SERPL-SCNC: 139 MMOL/L — SIGNIFICANT CHANGE UP (ref 135–145)
WBC # BLD: 13.1 K/UL — HIGH (ref 3.8–10.5)
WBC # FLD AUTO: 13.1 K/UL — HIGH (ref 3.8–10.5)

## 2023-06-27 PROCEDURE — 99232 SBSQ HOSP IP/OBS MODERATE 35: CPT

## 2023-06-27 RX ORDER — POTASSIUM CHLORIDE 20 MEQ
40 PACKET (EA) ORAL
Refills: 0 | Status: COMPLETED | OUTPATIENT
Start: 2023-06-27 | End: 2023-06-27

## 2023-06-27 RX ADMIN — Medication 40 MILLIEQUIVALENT(S): at 18:07

## 2023-06-27 RX ADMIN — CEFTRIAXONE 100 MILLIGRAM(S): 500 INJECTION, POWDER, FOR SOLUTION INTRAMUSCULAR; INTRAVENOUS at 20:43

## 2023-06-27 RX ADMIN — ENOXAPARIN SODIUM 40 MILLIGRAM(S): 100 INJECTION SUBCUTANEOUS at 18:07

## 2023-06-27 RX ADMIN — Medication 650 MILLIGRAM(S): at 22:53

## 2023-06-27 RX ADMIN — Medication 40 MILLIEQUIVALENT(S): at 12:23

## 2023-06-27 NOTE — PROGRESS NOTE ADULT - SUBJECTIVE AND OBJECTIVE BOX
Patient is a 73y old  Female who presents with a chief complaint of vomiting (26 Jun 2023 15:25)      INTERVAL HPI/OVERNIGHT EVENTS: No acute events overnight, afebrile.     MEDICATIONS  (STANDING):  cefTRIAXone   IVPB 1000 milliGRAM(s) IV Intermittent every 24 hours  enoxaparin Injectable 40 milliGRAM(s) SubCutaneous every 24 hours  potassium chloride   Powder 40 milliEquivalent(s) Oral every 2 hours    MEDICATIONS  (PRN):  acetaminophen     Tablet .. 650 milliGRAM(s) Oral every 6 hours PRN Temp greater or equal to 38C (100.4F), Mild Pain (1 - 3)  aluminum hydroxide/magnesium hydroxide/simethicone Suspension 30 milliLiter(s) Oral every 4 hours PRN Dyspepsia  melatonin 3 milliGRAM(s) Oral at bedtime PRN Insomnia  ondansetron Injectable 4 milliGRAM(s) IV Push every 8 hours PRN Nausea and/or Vomiting      Allergies    No Known Allergies    Intolerances        REVIEW OF SYSTEMS:  CONSTITUTIONAL: +ve for fatigue  EYES: No eye pain, visual disturbances, or discharge  ENMT:  No difficulty hearing, tinnitus, vertigo; No sinus or throat pain  NECK: No pain or stiffness      Vital Signs Last 24 Hrs  T(C): 37.2 (27 Jun 2023 12:13), Max: 37.3 (26 Jun 2023 19:55)  T(F): 99 (27 Jun 2023 12:13), Max: 99.2 (26 Jun 2023 19:55)  HR: 85 (27 Jun 2023 12:13) (80 - 90)  BP: 141/82 (27 Jun 2023 12:13) (138/79 - 150/87)  BP(mean): --  RR: 18 (27 Jun 2023 12:13) (18 - 18)  SpO2: 97% (27 Jun 2023 12:13) (95% - 97%)    Parameters below as of 27 Jun 2023 05:00  Patient On (Oxygen Delivery Method): room air        PHYSICAL EXAM:  GENERAL: NAD, well-groomed, well-developed  HEAD:  Atraumatic, Normocephalic  EYES: EOMI, PERRLA, conjunctiva and sclera clear  ENMT: No tonsillar erythema, exudates, or enlargement; Moist mucous membranes, Good dentition, No lesions      LABS:                        8.8    13.10 )-----------( 387      ( 27 Jun 2023 06:05 )             27.8     06-27    139  |  104  |  8   ----------------------------<  83  3.0<L>   |  28  |  0.57    Ca    8.5      27 Jun 2023 06:05  Phos  4.1     06-27  Mg     1.9     06-27    TPro  8.9<H>  /  Alb  3.2<L>  /  TBili  0.2  /  DBili  x   /  AST  21  /  ALT  15  /  AlkPhos  104  06-25    PT/INR - ( 25 Jun 2023 18:06 )   PT: 11.9 sec;   INR: 0.99 ratio         PTT - ( 25 Jun 2023 18:06 )  PTT:25.9 sec  Urinalysis Basic - ( 27 Jun 2023 06:05 )    Color: x / Appearance: x / SG: x / pH: x  Gluc: 83 mg/dL / Ketone: x  / Bili: x / Urobili: x   Blood: x / Protein: x / Nitrite: x   Leuk Esterase: x / RBC: x / WBC x   Sq Epi: x / Non Sq Epi: x / Bacteria: x

## 2023-06-27 NOTE — PROGRESS NOTE ADULT - ASSESSMENT
A/P    1. UTI:  - Culture pending, continue ceftriaxone  - Afebrile.    2. Hypokalemia:  - Replacing    3. Thrombocytosis:  - Resolved.    Dispo: Discharge tomorrow once we have final culture results.       Nino Mathew MD
ASSESSMENT:  This is a 73 year old female with PMHx of polymyalgia rheumatica not on meds presents for evaluation after a 1 day history of sudden onset abdominal cramping, initially starting yemi-umbilically, and progressively worsening into diffuse intermittent non-radiating abdominal pain.     PLAN:  1.Abdominal pain likely secondary to UTI vs gastroenteritis  -afebrile  -leukocytosis uptrending  -lipase 165  -LFTs within normal limits  -UA positive, started on ceftriaxone  -will f/u urine cultures  -CT abd/pelvis w/ contrast as above, no acute intra-abdominal pathology  - CXRAY neg  -stool cultures, O&P, and WBC if has >3 episodes of diarrhea in 24 hours    2.Anemia  -hemoglobin 9.6  -hematocrit 30.2  -will monitor for now  -will f/u iron, ferritin, TIBC, B12, folate and retic count    3.Thrombocytosis    4.PMR    5.DVT PPx  -will start lovenox 40mg SQ QD and SCDs    Dispo: abx, PT consult

## 2023-06-28 VITALS
RESPIRATION RATE: 17 BRPM | DIASTOLIC BLOOD PRESSURE: 85 MMHG | TEMPERATURE: 98 F | SYSTOLIC BLOOD PRESSURE: 150 MMHG | OXYGEN SATURATION: 97 % | HEART RATE: 91 BPM

## 2023-06-28 LAB
-  AMIKACIN: SIGNIFICANT CHANGE UP
-  AMOXICILLIN/CLAVULANIC ACID: SIGNIFICANT CHANGE UP
-  AMPICILLIN/SULBACTAM: SIGNIFICANT CHANGE UP
-  AMPICILLIN: SIGNIFICANT CHANGE UP
-  AZTREONAM: SIGNIFICANT CHANGE UP
-  CEFAZOLIN: SIGNIFICANT CHANGE UP
-  CEFEPIME: SIGNIFICANT CHANGE UP
-  CEFOXITIN: SIGNIFICANT CHANGE UP
-  CEFTRIAXONE: SIGNIFICANT CHANGE UP
-  CEFUROXIME: SIGNIFICANT CHANGE UP
-  CIPROFLOXACIN: SIGNIFICANT CHANGE UP
-  ERTAPENEM: SIGNIFICANT CHANGE UP
-  GENTAMICIN: SIGNIFICANT CHANGE UP
-  IMIPENEM: SIGNIFICANT CHANGE UP
-  LEVOFLOXACIN: SIGNIFICANT CHANGE UP
-  MEROPENEM: SIGNIFICANT CHANGE UP
-  NITROFURANTOIN: SIGNIFICANT CHANGE UP
-  PIPERACILLIN/TAZOBACTAM: SIGNIFICANT CHANGE UP
-  TOBRAMYCIN: SIGNIFICANT CHANGE UP
-  TRIMETHOPRIM/SULFAMETHOXAZOLE: SIGNIFICANT CHANGE UP
ANION GAP SERPL CALC-SCNC: 5 MMOL/L — SIGNIFICANT CHANGE UP (ref 5–17)
BUN SERPL-MCNC: 7 MG/DL — SIGNIFICANT CHANGE UP (ref 7–23)
CALCIUM SERPL-MCNC: 8.7 MG/DL — SIGNIFICANT CHANGE UP (ref 8.5–10.1)
CHLORIDE SERPL-SCNC: 106 MMOL/L — SIGNIFICANT CHANGE UP (ref 96–108)
CO2 SERPL-SCNC: 28 MMOL/L — SIGNIFICANT CHANGE UP (ref 22–31)
CREAT SERPL-MCNC: 0.59 MG/DL — SIGNIFICANT CHANGE UP (ref 0.5–1.3)
CULTURE RESULTS: SIGNIFICANT CHANGE UP
EGFR: 96 ML/MIN/1.73M2 — SIGNIFICANT CHANGE UP
GLUCOSE SERPL-MCNC: 87 MG/DL — SIGNIFICANT CHANGE UP (ref 70–99)
HCT VFR BLD CALC: 28.5 % — LOW (ref 34.5–45)
HGB BLD-MCNC: 9.2 G/DL — LOW (ref 11.5–15.5)
MCHC RBC-ENTMCNC: 23.8 PG — LOW (ref 27–34)
MCHC RBC-ENTMCNC: 32.3 G/DL — SIGNIFICANT CHANGE UP (ref 32–36)
MCV RBC AUTO: 73.8 FL — LOW (ref 80–100)
METHOD TYPE: SIGNIFICANT CHANGE UP
NRBC # BLD: 0 /100 WBCS — SIGNIFICANT CHANGE UP (ref 0–0)
ORGANISM # SPEC MICROSCOPIC CNT: SIGNIFICANT CHANGE UP
ORGANISM # SPEC MICROSCOPIC CNT: SIGNIFICANT CHANGE UP
PLATELET # BLD AUTO: 424 K/UL — HIGH (ref 150–400)
POTASSIUM SERPL-MCNC: 3.9 MMOL/L — SIGNIFICANT CHANGE UP (ref 3.5–5.3)
POTASSIUM SERPL-SCNC: 3.9 MMOL/L — SIGNIFICANT CHANGE UP (ref 3.5–5.3)
RBC # BLD: 3.86 M/UL — SIGNIFICANT CHANGE UP (ref 3.8–5.2)
RBC # FLD: 17.7 % — HIGH (ref 10.3–14.5)
SODIUM SERPL-SCNC: 139 MMOL/L — SIGNIFICANT CHANGE UP (ref 135–145)
SPECIMEN SOURCE: SIGNIFICANT CHANGE UP
WBC # BLD: 10.47 K/UL — SIGNIFICANT CHANGE UP (ref 3.8–10.5)
WBC # FLD AUTO: 10.47 K/UL — SIGNIFICANT CHANGE UP (ref 3.8–10.5)

## 2023-06-28 PROCEDURE — 99239 HOSP IP/OBS DSCHRG MGMT >30: CPT

## 2023-06-28 NOTE — DISCHARGE NOTE PROVIDER - NSDCMRMEDTOKEN_GEN_ALL_CORE_FT
acetaminophen 500 mg oral capsule: 2 cap(s) orally every 8 hours as needed for  moderate pain  amLODIPine 10 mg oral tablet: 1 tab(s) orally once a day

## 2023-06-28 NOTE — DISCHARGE NOTE PROVIDER - HOSPITAL COURSE
History of Present Illness:   This is a 73 year old female with PMHx of polymyalgia rheumatica not on meds presents for evaluation after a 1 day history of sudden onset abdominal cramping, initially starting yemi-umbilically, and progressively worsening into diffuse intermittent non-radiating abdominal pain.  as per son was at baseline, then suddenly while sitting developed pain.  Patient does not ambulate, so was picked up and placed on toilet for a possible bowel movement, but pain worsened.  She did have 1 episode of non-bloody non-bilious emesis.  +3 episodes watery, nonbloody diarrhea, last about 1 hour ago.  Of note she recently travelled from [facility] 3 days prior without issue.  Others have eaten same food as her in the house without issue.      1. E coli UTI:  - Finished antibiotic course x 3 days. no UTI symptoms.     2. Hypokalemia:  - Repleted.     3. Thrombocytosis:  - Resolved.    4. Chronic arthritic bilateral knee pain. recommended tylenol and NSAIDs as needed for pain and PCP follow up.     Seen and examined by me today. Vitals stable.   I have discussed all the inpatient radiographic findings with the patient and stressed that patient follows with the PCP for further outpatient care. I have educated patient to use [facility] patient portal (as instructed on the discharge paperwork) to access medical records outside the hospital.   All questions welcomed and answered appropriately. Patient verbalized understanding of post discharge physician's follows up and discharge instructions.   DC time spent by face to face me excluding billable procedures 38 mins ([place]  used [id number])

## 2023-06-28 NOTE — DISCHARGE NOTE NURSING/CASE MANAGEMENT/SOCIAL WORK - PATIENT PORTAL LINK FT
You can access the FollowMyHealth Patient Portal offered by [facility] by registering at the following website: [email or web address]. By joining [employer]’s FollowMyHealth portal, you will also be able to view your health information using other applications (apps) compatible with our system.

## 2023-06-28 NOTE — DISCHARGE NOTE PROVIDER - NSDCFUADDINST_GEN_ALL_CORE_FT
1) It is important to see your primary physician as well as other necessary consultants within next 7-10 days to perform a comprehensive medical review.  Call their offices for an appointment as soon as you leave the hospital.  If you do not have a primary physician or unable to reach your PCP, contact the [facility] Physician Referral Service at [phone].  Your medical issues appear to be stable at this time, but if your symptoms recur or worsen, contact your physicians and/or return to the hospital if necessary.  If you encounter any issues or questions with your medication, call your physicians before stopping the medication.    2) Please access [facility] Patient portal (as instructed on the discharge paperwork) to access your medical records at any time after discharge.     3) please drink plenty of liquids. Follow PCP for chronic knee pain.

## 2023-07-03 DIAGNOSIS — M25.562 PAIN IN LEFT KNEE: ICD-10-CM

## 2023-07-03 DIAGNOSIS — D64.9 ANEMIA, UNSPECIFIED: ICD-10-CM

## 2023-07-03 DIAGNOSIS — D75.839 THROMBOCYTOSIS, UNSPECIFIED: ICD-10-CM

## 2023-07-03 DIAGNOSIS — E87.6 HYPOKALEMIA: ICD-10-CM

## 2023-07-03 DIAGNOSIS — N39.0 URINARY TRACT INFECTION, SITE NOT SPECIFIED: ICD-10-CM

## 2023-07-03 DIAGNOSIS — B96.20 UNSPECIFIED ESCHERICHIA COLI [E. COLI] AS THE CAUSE OF DISEASES CLASSIFIED ELSEWHERE: ICD-10-CM

## 2023-07-03 DIAGNOSIS — M35.3 POLYMYALGIA RHEUMATICA: ICD-10-CM

## 2023-07-03 DIAGNOSIS — M25.561 PAIN IN RIGHT KNEE: ICD-10-CM

## 2024-03-16 NOTE — PATIENT PROFILE ADULT - ARE SIGNIFICANT INDICATORS COMPLETE.
Contributing Factors:  Systolic BP  Lactate    MAP  Bilirubin    Systolic BP Drop  PLT    O2 Device  Creatinine 2.24   Non-Surgical Airway  INR      PTT       Patient meets criteria for Severe Sepsis:  Contact provider to initiate Sepsis Huddle  In Sepsis Navigator:  Document huddle outcome  Review 3 and 6 hour bundle compliance checklist  Last 3 Vitals         3/16/2024  0550 3/16/2024  0735 3/16/2024  1057       Temp: -- 97.5 Â°F (36.4 Â°C) 97.3 Â°F (36.3 Â°C)     Pulse: 95 73 101     Resp: 18 -- 20     BP: 109/77 95/68 994/30     Systolic BP Drop (calculated): 19 -14 7     MAP (mmHg): 88 77 81     O2 Device: Nasal cannula Nasal cannula --           Last 3 Labs         03/15/24 03/16/24 03/16/24     0549 0534 0732    LACTATE -- -- 1.8    TOTAL BILIRUBIN -- 1.3 --    PLATELETS 948 315 --    CREATININE 2.39 2.24 --    PTT -- -- 32    PT -- -- 12.9    INR -- -- 1.2    WBC 22.8 22.9 --        Responded to Sepsis Alert called at 07:20  Per the Primary RN the SEVERE sepsis BPA fired during shift report. SIRS HR, WBC and EOD 2.24  CXR, lactic acid ordered.    Patient remains on unit  Lactic acid 1.8 60 Yes